# Patient Record
Sex: FEMALE | URBAN - METROPOLITAN AREA
[De-identification: names, ages, dates, MRNs, and addresses within clinical notes are randomized per-mention and may not be internally consistent; named-entity substitution may affect disease eponyms.]

---

## 2020-07-01 ENCOUNTER — WEB ENCOUNTER (OUTPATIENT)
Dept: URBAN - METROPOLITAN AREA CLINIC 105 | Facility: CLINIC | Age: 30
End: 2020-07-01

## 2020-07-07 ENCOUNTER — DASHBOARD ENCOUNTERS (OUTPATIENT)
Age: 30
End: 2020-07-07

## 2020-07-08 ENCOUNTER — OFFICE VISIT (OUTPATIENT)
Dept: URBAN - METROPOLITAN AREA TELEHEALTH 2 | Facility: TELEHEALTH | Age: 30
End: 2020-07-08
Payer: COMMERCIAL

## 2020-07-08 DIAGNOSIS — K58.1 IRRITABLE BOWEL SYNDROME WITH CONSTIPATION: ICD-10-CM

## 2020-07-08 DIAGNOSIS — K92.89 GAS BLOAT SYNDROME: ICD-10-CM

## 2020-07-08 DIAGNOSIS — R10.11 RUQ PAIN: ICD-10-CM

## 2020-07-08 DIAGNOSIS — R16.0 LIVER MASS: ICD-10-CM

## 2020-07-08 DIAGNOSIS — K76.0 FATTY LIVER: ICD-10-CM

## 2020-07-08 DIAGNOSIS — R94.5 ABNORMAL LFTS: ICD-10-CM

## 2020-07-08 PROBLEM — 300332007: Status: ACTIVE | Noted: 2020-07-08

## 2020-07-08 PROBLEM — 119292006: Status: ACTIVE | Noted: 2020-07-08

## 2020-07-08 PROBLEM — 301717006: Status: ACTIVE | Noted: 2020-07-08

## 2020-07-08 PROBLEM — 166603001: Status: ACTIVE | Noted: 2020-07-08

## 2020-07-08 PROBLEM — 197321007: Status: ACTIVE | Noted: 2020-07-08

## 2020-07-08 PROBLEM — 440630006: Status: ACTIVE | Noted: 2020-07-08

## 2020-07-08 PROCEDURE — G9903 PT SCRN TBCO ID AS NON USER: HCPCS | Performed by: INTERNAL MEDICINE

## 2020-07-08 PROCEDURE — 1036F TOBACCO NON-USER: CPT | Performed by: INTERNAL MEDICINE

## 2020-07-08 PROCEDURE — 99204 OFFICE O/P NEW MOD 45 MIN: CPT | Performed by: INTERNAL MEDICINE

## 2020-07-08 RX ORDER — LINACLOTIDE 72 UG/1
1 CAPSULE AT LEAST 30 MINUTES BEFORE THE FIRST MEAL OF THE DAY ON AN EMPTY STOMACH CAPSULE, GELATIN COATED ORAL ONCE A DAY
Qty: 90 | Refills: 3 | OUTPATIENT
Start: 2020-07-08 | End: 2021-07-03

## 2020-07-08 NOTE — HPI-OTHER HISTORIES
The pt now presents for evaluation of abdominal pain and liver problems. She was diagnosed with liver mass. She notes that the abd u/s revealed a liver mass approx. 5cm. She now presents for a second opinion. She notes increased heartburn and indigestion at night.  She struggles with constipation and lower abdominal pain.

## 2020-07-08 NOTE — PHYSICAL EXAM GASTROINTESTINAL
Abdomen , soft, tenderness in the RUQ with no rebound tenderness  nondistended , no guarding or rigidity , no masses palpable , normal bowel sounds , Liver and Spleen , no hepatomegaly present , no hepatosplenomegaly , liver nontender , spleen not palpable

## 2020-08-18 ENCOUNTER — OFFICE VISIT (OUTPATIENT)
Dept: URBAN - METROPOLITAN AREA SURGERY CENTER 16 | Facility: SURGERY CENTER | Age: 30
End: 2020-08-18

## 2021-09-07 ENCOUNTER — WEB ENCOUNTER (OUTPATIENT)
Dept: URBAN - METROPOLITAN AREA CLINIC 105 | Facility: CLINIC | Age: 31
End: 2021-09-07